# Patient Record
Sex: MALE | Race: WHITE
[De-identification: names, ages, dates, MRNs, and addresses within clinical notes are randomized per-mention and may not be internally consistent; named-entity substitution may affect disease eponyms.]

---

## 2018-06-22 ENCOUNTER — HOSPITAL ENCOUNTER (EMERGENCY)
Dept: HOSPITAL 54 - ER | Age: 58
Discharge: HOME | End: 2018-06-22
Payer: COMMERCIAL

## 2018-06-22 VITALS — DIASTOLIC BLOOD PRESSURE: 60 MMHG | SYSTOLIC BLOOD PRESSURE: 127 MMHG

## 2018-06-22 VITALS — WEIGHT: 150 LBS | HEIGHT: 70 IN | BODY MASS INDEX: 21.47 KG/M2

## 2018-06-22 DIAGNOSIS — M54.30: ICD-10-CM

## 2018-06-22 DIAGNOSIS — R55: Primary | ICD-10-CM

## 2018-06-22 DIAGNOSIS — G89.29: ICD-10-CM

## 2018-06-22 DIAGNOSIS — N17.9: ICD-10-CM

## 2018-06-22 DIAGNOSIS — E86.0: ICD-10-CM

## 2018-06-22 LAB
BASOPHILS # BLD AUTO: 0 /CMM (ref 0–0.2)
BASOPHILS NFR BLD AUTO: 0.3 % (ref 0–2)
BUN SERPL-MCNC: 27 MG/DL (ref 7–18)
CALCIUM SERPL-MCNC: 9.7 MG/DL (ref 8.5–10.1)
CHLORIDE SERPL-SCNC: 101 MMOL/L (ref 98–107)
CO2 SERPL-SCNC: 19 MMOL/L (ref 21–32)
CREAT SERPL-MCNC: 1.9 MG/DL (ref 0.6–1.3)
EOSINOPHIL NFR BLD AUTO: 0.1 % (ref 0–6)
GLUCOSE SERPL-MCNC: 120 MG/DL (ref 74–106)
HCT VFR BLD AUTO: 46 % (ref 39–51)
HGB BLD-MCNC: 15.2 G/DL (ref 13.5–17.5)
LYMPHOCYTES NFR BLD AUTO: 1 /CMM (ref 0.8–4.8)
LYMPHOCYTES NFR BLD AUTO: 8.2 % (ref 20–44)
MCHC RBC AUTO-ENTMCNC: 33 G/DL (ref 31–36)
MCV RBC AUTO: 76 FL (ref 80–96)
MONOCYTES NFR BLD AUTO: 0.6 /CMM (ref 0.1–1.3)
MONOCYTES NFR BLD AUTO: 4.9 % (ref 2–12)
NEUTROPHILS # BLD AUTO: 10.4 /CMM (ref 1.8–8.9)
NEUTROPHILS NFR BLD AUTO: 86.5 % (ref 43–81)
PLATELET # BLD AUTO: 298 /CMM (ref 150–450)
POTASSIUM SERPL-SCNC: 4.2 MMOL/L (ref 3.5–5.1)
RBC # BLD AUTO: 6.01 MIL/UL (ref 4.5–6)
RDW COEFFICIENT OF VARIATION: 13.8 (ref 11.5–15)
SODIUM SERPL-SCNC: 138 MMOL/L (ref 136–145)
WBC NRBC COR # BLD AUTO: 12 K/UL (ref 4.3–11)

## 2018-06-22 PROCEDURE — 36415 COLL VENOUS BLD VENIPUNCTURE: CPT

## 2018-06-22 PROCEDURE — 85025 COMPLETE CBC W/AUTO DIFF WBC: CPT

## 2018-06-22 PROCEDURE — A4606 OXYGEN PROBE USED W OXIMETER: HCPCS

## 2018-06-22 PROCEDURE — 93005 ELECTROCARDIOGRAM TRACING: CPT

## 2018-06-22 PROCEDURE — Z7610: HCPCS

## 2018-06-22 PROCEDURE — 96360 HYDRATION IV INFUSION INIT: CPT

## 2018-06-22 PROCEDURE — 99285 EMERGENCY DEPT VISIT HI MDM: CPT

## 2018-06-22 PROCEDURE — 71045 X-RAY EXAM CHEST 1 VIEW: CPT

## 2018-06-22 PROCEDURE — 80048 BASIC METABOLIC PNL TOTAL CA: CPT

## 2018-06-22 NOTE — NUR
BIB [facility] C/O WITNESSED SEIZURE IN SHOWER, PT STATES HE HAS STOPPED TAKING XANAX. 
NAD NOTED, VSS, RESP EVEN AND UNLABORED, PT WAS PUT ON MONITOR. MD AT BS.

## 2019-12-23 ENCOUNTER — HOSPITAL ENCOUNTER (EMERGENCY)
Dept: HOSPITAL 12 - ER | Age: 59
Discharge: HOME | End: 2019-12-23
Payer: COMMERCIAL

## 2019-12-23 VITALS — WEIGHT: 170 LBS | HEIGHT: 68 IN | BODY MASS INDEX: 25.76 KG/M2

## 2019-12-23 VITALS — SYSTOLIC BLOOD PRESSURE: 147 MMHG | DIASTOLIC BLOOD PRESSURE: 65 MMHG

## 2019-12-23 DIAGNOSIS — J06.9: Primary | ICD-10-CM

## 2019-12-23 DIAGNOSIS — F17.200: ICD-10-CM

## 2019-12-23 PROCEDURE — A4663 DIALYSIS BLOOD PRESSURE CUFF: HCPCS

## 2019-12-23 NOTE — NUR
PT AMBULATORY FR HOME

C/O DRY COUGH 4-5DAYS



AOX3, ABLE TO SPEAK CLEAR AND COMPLETE SENTENCES

DENIES RECENT TRAVELS 

DENIES SOB/NVD/CHILLS/JOINT PAIN

REPORTS WIFE HAD THE SAME SYMPTOMS INITIALLY 



FYE3DGS 99%

NO WHEEZING, NO CRACKLES ON BOTH LUNG TURK

MONITORED ACCORDINGLY

## 2019-12-29 ENCOUNTER — HOSPITAL ENCOUNTER (EMERGENCY)
Age: 59
Discharge: LEFT BEFORE DISCHARGE | End: 2019-12-29

## 2019-12-29 DIAGNOSIS — Z53.21: Primary | ICD-10-CM

## 2020-02-15 ENCOUNTER — HOSPITAL ENCOUNTER (INPATIENT)
Dept: HOSPITAL 54 - ER | Age: 60
LOS: 1 days | Discharge: LEFT BEFORE DISCHARGE | DRG: 347 | End: 2020-02-16
Attending: NURSE PRACTITIONER | Admitting: NURSE PRACTITIONER
Payer: COMMERCIAL

## 2020-02-15 VITALS — BODY MASS INDEX: 26.51 KG/M2 | HEIGHT: 69 IN | WEIGHT: 179 LBS

## 2020-02-15 DIAGNOSIS — G89.4: ICD-10-CM

## 2020-02-15 DIAGNOSIS — M54.30: ICD-10-CM

## 2020-02-15 DIAGNOSIS — M48.061: Primary | ICD-10-CM

## 2020-02-15 LAB
BASOPHILS # BLD AUTO: 0 /CMM (ref 0–0.2)
BASOPHILS NFR BLD AUTO: 0.7 % (ref 0–2)
BUN SERPL-MCNC: 12 MG/DL (ref 7–18)
CALCIUM SERPL-MCNC: 8.4 MG/DL (ref 8.5–10.1)
CHLORIDE SERPL-SCNC: 107 MMOL/L (ref 98–107)
CO2 SERPL-SCNC: 30 MMOL/L (ref 21–32)
CREAT SERPL-MCNC: 1.1 MG/DL (ref 0.6–1.3)
EOSINOPHIL NFR BLD AUTO: 0.4 % (ref 0–6)
GLUCOSE SERPL-MCNC: 106 MG/DL (ref 74–106)
HCT VFR BLD AUTO: 43 % (ref 39–51)
HGB BLD-MCNC: 13.8 G/DL (ref 13.5–17.5)
LYMPHOCYTES NFR BLD AUTO: 2 /CMM (ref 0.8–4.8)
LYMPHOCYTES NFR BLD AUTO: 34.6 % (ref 20–44)
MCHC RBC AUTO-ENTMCNC: 32 G/DL (ref 31–36)
MCV RBC AUTO: 78 FL (ref 80–96)
MONOCYTES NFR BLD AUTO: 0.4 /CMM (ref 0.1–1.3)
MONOCYTES NFR BLD AUTO: 7.8 % (ref 2–12)
NEUTROPHILS # BLD AUTO: 3.2 /CMM (ref 1.8–8.9)
NEUTROPHILS NFR BLD AUTO: 56.5 % (ref 43–81)
PLATELET # BLD AUTO: 203 /CMM (ref 150–450)
POTASSIUM SERPL-SCNC: 4 MMOL/L (ref 3.5–5.1)
RBC # BLD AUTO: 5.52 MIL/UL (ref 4.5–6)
SODIUM SERPL-SCNC: 144 MMOL/L (ref 136–145)
WBC NRBC COR # BLD AUTO: 5.7 K/UL (ref 4.3–11)

## 2020-02-15 PROCEDURE — G0378 HOSPITAL OBSERVATION PER HR: HCPCS

## 2020-02-15 NOTE — NUR
PT BIBRA FROM HOME C/O LOWER BACK PAIN S/P HEAVY LIFTING (APPROX 90 LBS) PT 
AAOX4. RESPIRATIONS EVEN AND UNLABORED. SKIN INTACT. VITAL SIGNS STABLE. NO 
ACUTE DISTRESS NOTED AT THIS TIME. WILL CONTINUE TO MONITOR

## 2020-02-15 NOTE — NUR
ATTEMPTED TO ASSIST PATIENT TO AMBULATE, PT UNABLE TO BEAR WEIGHT AND FEELS 
UNCOMFORTABLE. ER PA AWARE

## 2020-02-16 VITALS — DIASTOLIC BLOOD PRESSURE: 83 MMHG | SYSTOLIC BLOOD PRESSURE: 131 MMHG

## 2020-02-16 VITALS — DIASTOLIC BLOOD PRESSURE: 92 MMHG | SYSTOLIC BLOOD PRESSURE: 145 MMHG

## 2020-02-16 LAB
APPEARANCE UR: CLEAR
BILIRUB UR QL STRIP: NEGATIVE
COLOR UR: YELLOW
DEPRECATED SQUAMOUS URNS QL MICRO: (no result) /HPF
GLUCOSE UR STRIP-MCNC: NEGATIVE MG/DL
HGB UR QL STRIP: NEGATIVE ERY/UL
KETONES UR STRIP-MCNC: (no result) MG/DL
LEUKOCYTE ESTERASE UR QL STRIP: NEGATIVE
NITRITE UR QL STRIP: NEGATIVE
PH UR STRIP: 7 [PH] (ref 5–8)
PROT UR QL STRIP: (no result) MG/DL
RBC #/AREA URNS HPF: (no result) /HPF (ref 0–2)
UROBILINOGEN UR STRIP-MCNC: 0.2 EU/DL
WBC #/AREA URNS HPF: (no result) /HPF (ref 0–3)

## 2020-02-16 RX ADMIN — DEXTROSE MONOHYDRATE SCH MG: 50 INJECTION, SOLUTION INTRAVENOUS at 00:39

## 2020-02-16 RX ADMIN — HYDROMORPHONE HYDROCHLORIDE PRN MG: 2 INJECTION, SOLUTION INTRAMUSCULAR; INTRAVENOUS; SUBCUTANEOUS at 05:31

## 2020-02-16 RX ADMIN — DEXTROSE MONOHYDRATE SCH MG: 50 INJECTION, SOLUTION INTRAVENOUS at 08:45

## 2020-02-16 RX ADMIN — HYDROMORPHONE HYDROCHLORIDE PRN MG: 2 INJECTION, SOLUTION INTRAMUSCULAR; INTRAVENOUS; SUBCUTANEOUS at 01:05

## 2020-02-16 RX ADMIN — CARISOPRODOL SCH MG: 350 TABLET ORAL at 00:39

## 2020-02-16 RX ADMIN — CARISOPRODOL SCH MG: 350 TABLET ORAL at 08:17

## 2020-02-16 NOTE — NUR
RN NOTES: AMA



PATIENT LEFT AGAINST MEDICAL ADVICE. PER PATIENT, HE WANTED TO GO HOME AND REST INSTEAD. 
STATED THAT HE "FEELS BETTER NOW.". EXPLAINED RISK OF LEAVING AMA BUT PATIENT STILL ADAMANT 
TO LEAVE THE HOSPITAL. AMA FORM SIGNED. EXITCARE AND PAPERWORK PROVIDED. ALL BELONGINGS 
RETURNED, BELONGINGS FORM SIGNED. IV ACCESS REMOVED, TIP INTACT. NAMEBAND REMOVED. 
ACCOMPANIED TO THE LOBBY, PICKED UP BY WIFE. DR BLOOM IS AWARE. SUPERVISOR NOTIFIED.

## 2020-02-16 NOTE — NUR
MS2/RN

AT 0025, RECEIVED PATIENT FROM . VIA [facility] TO BE ADMITTED FOR INTRACTABLE LOW BACK PAIN. 
PATIENT WAS AWAKE, ALERT, ORIENTED, WITH C/O LOW BACK PAIN 8/10, MEDICATED WITH DILAUDID 1 
MG IV AS ORDERED, DUE MEDS WERE GIVEN. ADMISSION DONE PER PROTOCOL, SKIN ASSESSMENT WAS 
DONE, PLAN OF CARE DISCUSSED, VERBALIZED UNDERSTANDING AND AGREEMENT, TAUGHT HOW TO USE THE 
CALL LIGHT AND PLACED IT AT BEDSIDE WITHIN REACH. MEDICATED ALSO WITH MAALOX AS ORDERED FOR 
C/O ABDOMINAL DISCOMFORT, (SEE EMAR FOR TIME OF ADMIN).  PRESENTLY, PATIENT IS SLEEPING, 
AROUSABLE, APPEAR COMFORTABLE, NO SIGNS OF DISTRESS NOTED, CALL LIGHT IN REACH. WILL 
MONITOR.

## 2020-02-16 NOTE — NUR
RN OPENING NOTES



RECEIVED PATIENT IN THE ROOM SITTING ON THE CHAIR. A/OX3, ABLE TO MAKE NEEDS KNOWN. NOT IN 
ANY FORM OF DISTRESS. NO SOB. TOLERATING BACK PAIN AT THIS TIME. KEPT PATIENT SAFE AND 
COMFORTABLE. BED IN LOW/LOCKED POSITION, SIDERAILS UPX2, CALL LIGHT IN REACH. WILL CONT TO 
MONITOR ACCORDINGLY.

## 2020-02-16 NOTE — NUR
MS2/RN

PATIENT IS SLEEPING AT THIS TIME, APPEAR COMFORTABLE, NO SIGNS OF DISTRESS NOTED, CALL LIGHT 
IN REACH, ALL NEEDS ATTENDED AT THIS TIME, WILL CONTINUE TO MONITOR.

## 2022-08-07 ENCOUNTER — HOSPITAL ENCOUNTER (EMERGENCY)
Dept: HOSPITAL 12 - ER | Age: 62
Discharge: HOME | End: 2022-08-07
Payer: COMMERCIAL

## 2022-08-07 VITALS — HEIGHT: 69 IN | WEIGHT: 153 LBS | BODY MASS INDEX: 22.66 KG/M2

## 2022-08-07 VITALS — SYSTOLIC BLOOD PRESSURE: 132 MMHG | DIASTOLIC BLOOD PRESSURE: 79 MMHG

## 2022-08-07 DIAGNOSIS — S50.812A: ICD-10-CM
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
## 2022-08-07 DIAGNOSIS — S50.12XA: Primary | ICD-10-CM
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
## 2022-08-07 PROCEDURE — A4663 DIALYSIS BLOOD PRESSURE CUFF: HCPCS
